# Patient Record
Sex: FEMALE | Race: WHITE | NOT HISPANIC OR LATINO | URBAN - NONMETROPOLITAN AREA
[De-identification: names, ages, dates, MRNs, and addresses within clinical notes are randomized per-mention and may not be internally consistent; named-entity substitution may affect disease eponyms.]

---

## 2023-03-22 DIAGNOSIS — I10 BENIGN ESSENTIAL HTN: Primary | ICD-10-CM

## 2023-03-22 RX ORDER — METOPROLOL TARTRATE 50 MG/1
50 TABLET ORAL 2 TIMES DAILY
Qty: 60 TABLET | Refills: 2 | Status: SHIPPED | OUTPATIENT
Start: 2023-03-22 | End: 2023-06-19 | Stop reason: SDUPTHER

## 2023-03-22 RX ORDER — METOPROLOL TARTRATE 50 MG/1
1 TABLET ORAL 2 TIMES DAILY
COMMUNITY
Start: 2022-11-18 | End: 2023-03-22 | Stop reason: SDUPTHER

## 2023-06-19 DIAGNOSIS — I10 BENIGN ESSENTIAL HTN: ICD-10-CM

## 2023-06-20 RX ORDER — METOPROLOL TARTRATE 50 MG/1
50 TABLET ORAL 2 TIMES DAILY
Qty: 60 TABLET | Refills: 0 | Status: SHIPPED | OUTPATIENT
Start: 2023-06-20 | End: 2023-07-15 | Stop reason: SDUPTHER

## 2023-07-15 DIAGNOSIS — I10 BENIGN ESSENTIAL HTN: ICD-10-CM

## 2023-07-17 RX ORDER — METOPROLOL TARTRATE 50 MG/1
50 TABLET ORAL 2 TIMES DAILY
Qty: 60 TABLET | Refills: 0 | Status: SHIPPED | OUTPATIENT
Start: 2023-07-17 | End: 2023-08-12 | Stop reason: SDUPTHER

## 2023-08-12 DIAGNOSIS — I10 BENIGN ESSENTIAL HTN: ICD-10-CM

## 2023-08-12 RX ORDER — METOPROLOL TARTRATE 50 MG/1
50 TABLET ORAL 2 TIMES DAILY
Qty: 30 TABLET | Refills: 0 | Status: SHIPPED | OUTPATIENT
Start: 2023-08-12 | End: 2023-09-05 | Stop reason: SDUPTHER

## 2023-09-05 DIAGNOSIS — I10 BENIGN ESSENTIAL HTN: ICD-10-CM

## 2023-09-06 RX ORDER — METOPROLOL TARTRATE 50 MG/1
50 TABLET ORAL 2 TIMES DAILY
Qty: 30 TABLET | Refills: 0 | Status: SHIPPED | OUTPATIENT
Start: 2023-09-06 | End: 2023-09-11 | Stop reason: SDUPTHER

## 2023-09-11 DIAGNOSIS — I10 BENIGN ESSENTIAL HTN: ICD-10-CM

## 2023-09-12 RX ORDER — METOPROLOL TARTRATE 50 MG/1
50 TABLET ORAL 2 TIMES DAILY
Qty: 90 TABLET | Refills: 3 | Status: SHIPPED | OUTPATIENT
Start: 2023-09-12 | End: 2023-12-27 | Stop reason: SINTOL

## 2023-10-02 ENCOUNTER — OFFICE VISIT (OUTPATIENT)
Dept: PRIMARY CARE | Facility: CLINIC | Age: 78
End: 2023-10-02
Payer: COMMERCIAL

## 2023-10-02 VITALS
BODY MASS INDEX: 25.46 KG/M2 | DIASTOLIC BLOOD PRESSURE: 58 MMHG | WEIGHT: 153 LBS | SYSTOLIC BLOOD PRESSURE: 126 MMHG | HEART RATE: 60 BPM | OXYGEN SATURATION: 99 %

## 2023-10-02 DIAGNOSIS — I10 BENIGN ESSENTIAL HTN: Primary | ICD-10-CM

## 2023-10-02 PROBLEM — I89.0 LYMPHEDEMA OF BOTH LOWER EXTREMITIES: Status: ACTIVE | Noted: 2023-10-02

## 2023-10-02 PROBLEM — R29.898 OTHER SYMPTOMS AND SIGNS INVOLVING THE MUSCULOSKELETAL SYSTEM: Status: ACTIVE | Noted: 2023-10-02

## 2023-10-02 PROBLEM — I87.2 VENOUS INSUFFICIENCY: Status: ACTIVE | Noted: 2023-10-02

## 2023-10-02 PROBLEM — J30.9 ALLERGIC RHINITIS: Status: ACTIVE | Noted: 2023-10-02

## 2023-10-02 PROBLEM — I89.0 LYMPHEDEMA OF LEG: Status: ACTIVE | Noted: 2023-10-02

## 2023-10-02 LAB
ALBUMIN SERPL BCP-MCNC: 4 G/DL (ref 3.4–5)
ALP SERPL-CCNC: 72 U/L (ref 33–136)
ALT SERPL W P-5'-P-CCNC: 17 U/L (ref 7–45)
ANION GAP SERPL CALC-SCNC: 16 MMOL/L (ref 10–20)
AST SERPL W P-5'-P-CCNC: 20 U/L (ref 9–39)
BASOPHILS # BLD AUTO: 0.04 X10*3/UL (ref 0–0.1)
BASOPHILS NFR BLD AUTO: 0.7 %
BILIRUB SERPL-MCNC: 0.3 MG/DL (ref 0–1.2)
BUN SERPL-MCNC: 16 MG/DL (ref 6–23)
CALCIUM SERPL-MCNC: 8.6 MG/DL (ref 8.6–10.3)
CHLORIDE SERPL-SCNC: 104 MMOL/L (ref 98–107)
CO2 SERPL-SCNC: 26 MMOL/L (ref 21–32)
CREAT SERPL-MCNC: 0.84 MG/DL (ref 0.5–1.05)
EOSINOPHIL # BLD AUTO: 0.29 X10*3/UL (ref 0–0.4)
EOSINOPHIL NFR BLD AUTO: 4.9 %
ERYTHROCYTE [DISTWIDTH] IN BLOOD BY AUTOMATED COUNT: 13.7 % (ref 11.5–14.5)
GFR SERPL CREATININE-BSD FRML MDRD: 73 ML/MIN/1.73M*2
GLUCOSE SERPL-MCNC: 98 MG/DL (ref 74–99)
HCT VFR BLD AUTO: 36.9 % (ref 36–46)
HGB BLD-MCNC: 11.6 G/DL (ref 12–16)
IMM GRANULOCYTES # BLD AUTO: 0.01 X10*3/UL (ref 0–0.5)
IMM GRANULOCYTES NFR BLD AUTO: 0.2 % (ref 0–0.9)
LYMPHOCYTES # BLD AUTO: 1.82 X10*3/UL (ref 0.8–3)
LYMPHOCYTES NFR BLD AUTO: 30.7 %
MCH RBC QN AUTO: 30.6 PG (ref 26–34)
MCHC RBC AUTO-ENTMCNC: 31.4 G/DL (ref 32–36)
MCV RBC AUTO: 97 FL (ref 80–100)
MONOCYTES # BLD AUTO: 0.58 X10*3/UL (ref 0.05–0.8)
MONOCYTES NFR BLD AUTO: 9.8 %
NEUTROPHILS # BLD AUTO: 3.19 X10*3/UL (ref 1.6–5.5)
NEUTROPHILS NFR BLD AUTO: 53.7 %
NRBC BLD-RTO: 0 /100 WBCS (ref 0–0)
PLATELET # BLD AUTO: 211 X10*3/UL (ref 150–450)
PMV BLD AUTO: 11.8 FL (ref 7.5–11.5)
POTASSIUM SERPL-SCNC: 4.1 MMOL/L (ref 3.5–5.3)
PROT SERPL-MCNC: 6.4 G/DL (ref 6.4–8.2)
RBC # BLD AUTO: 3.79 X10*6/UL (ref 4–5.2)
SODIUM SERPL-SCNC: 142 MMOL/L (ref 136–145)
TSH SERPL-ACNC: 3.36 MIU/L (ref 0.44–3.98)
WBC # BLD AUTO: 5.9 X10*3/UL (ref 4.4–11.3)

## 2023-10-02 PROCEDURE — 99213 OFFICE O/P EST LOW 20 MIN: CPT | Performed by: FAMILY MEDICINE

## 2023-10-02 PROCEDURE — 36415 COLL VENOUS BLD VENIPUNCTURE: CPT

## 2023-10-02 PROCEDURE — 3078F DIAST BP <80 MM HG: CPT | Performed by: FAMILY MEDICINE

## 2023-10-02 PROCEDURE — 1036F TOBACCO NON-USER: CPT | Performed by: FAMILY MEDICINE

## 2023-10-02 PROCEDURE — 1160F RVW MEDS BY RX/DR IN RCRD: CPT | Performed by: FAMILY MEDICINE

## 2023-10-02 PROCEDURE — 1159F MED LIST DOCD IN RCRD: CPT | Performed by: FAMILY MEDICINE

## 2023-10-02 PROCEDURE — 3074F SYST BP LT 130 MM HG: CPT | Performed by: FAMILY MEDICINE

## 2023-10-02 RX ORDER — SPIRONOLACTONE 25 MG/1
25 TABLET ORAL DAILY
COMMUNITY
Start: 2023-07-13 | End: 2023-10-02 | Stop reason: SDUPTHER

## 2023-10-02 RX ORDER — SPIRONOLACTONE 25 MG/1
25 TABLET ORAL DAILY
Qty: 90 TABLET | Refills: 3 | Status: SHIPPED | OUTPATIENT
Start: 2023-10-02

## 2023-10-02 RX ORDER — ASPIRIN 325 MG
325 TABLET ORAL DAILY
COMMUNITY
End: 2023-12-27 | Stop reason: SINTOL

## 2023-10-02 NOTE — PATIENT INSTRUCTIONS
I will have your test results on your secureach card within a week -   If not -  please call me     If you decide you would like to get an echo on your heart (ultrasound)    - let me know - I will place an order       Hypertension Reminders:    IF YOU ARE A PERSON WHOSE BLOOD PRESSURE RUNS HIGH IN THE DOCTOR'S OFFICE,  THEN WE NEED TO VERIFY YOUR CUFF AT LEAST  ONCE YEARLY.   ALWAYS BRING CUFF WITH YOU TO ANY HYPERTENSION CHECK UP APPOINTMENT.  WE CAN RECORD YOUR BP  FROM HOME THE DAY OF THE APPOINTMENT - BUT WE HAVE TO SEE IT ON YOUR MACHINE.      To accurately check your blood pressure -  be sure to sit and relax for 5 minutes, you need your back supported, feet flat on the ground, arm heart level and relaxed.    Generally speaking, well controlled hypertension is below 130/80   for  most people  and if you are over 75, below 140/90  is acceptable.    Please take your medication as directed, and if you forget a dose  DO NOT DOUBLE THE DOSE THE NEXT DAY, just take is as you normally would.     It is important to stay on a low sodium diet :  1500 - 2000 mg of sodium a day  -  it is important to read labels.    Regular Exercise is very important as well.  Always gradually increase your exercise regimen.  Your goal is 30 minutes of a good cardiovascular exercise at least 5 days a week.    IF YOUR BMI (BODY MASS INDEX) IS OVER 25, LOSING WEIGHT WILL HELP CONTROL YOUR BLOOD PRESSURE.   Talk to me further if you need help doing this.        It is very important NOT to smoke  or use any tobacco products.  Talk to me about options if you want help quitting.    It is very important to keep your alcohol in take low.   Generally speaking, adult men should not drink more than 2 regular size beers a day, or no more than 2 ounces of liquor, or no more than 12 ounces of wine.  For adult women - the recommendations are half that.    BUT , THIS IS NOT UNIVERSAL   - be sure to ask me if alcohol is safe for you to drink, and if so,  the acceptable amount.      Follow up in a year is fine if every thing is good

## 2023-10-02 NOTE — PROGRESS NOTES
Subjective   Patient ID: Mita Snyder is a 77 y.o. female who presents for Hypertension (Yearly check up for refills.).    HPI      LOV 11/2022       Updates and Concerns:     BP was still fluctuating -   Had the [facility] out -   They started the spironolactone about 6 mos ago - takes daily -   No blood work has been checked -    Pt is taking an over the counter potassium pill          HTN -   Taking Metoprolol  50 mg BID  -     And spironolactone 25 mg a day     (New pt to me 2021   Changed her from ACE due to how she was feeling to Losartan/HCTZ   -   Was having a lot of rashes -   Eventually changed to Metoprolol )         Chronic Edema  and Varicose veins -        Taking natural things for veins          And  mg a day     No longer wearing support stockings         She was having a lot of fatigue -     That's when she started the potassium   Willing to check it today          WAC -   Postmenopausal   No vaccines - except polio as a child  No mammograms  No scopes     Sister is Temi Snyder       - Tony Snyder - Sees DR Robert   She is Tony's second wife - she has 15 step kids        Review of Systems    Objective   /58 (BP Location: Right arm, Patient Position: Sitting, BP Cuff Size: Adult)   Pulse 60   Wt 69.4 kg (153 lb)   SpO2 99%   BMI 25.46 kg/m²     Physical Exam  Vitals reviewed.   Constitutional:       General: She is not in acute distress.     Appearance: Normal appearance.   HENT:      Head: Normocephalic and atraumatic.      Nose: Nose normal.      Mouth/Throat:      Mouth: Mucous membranes are moist.      Pharynx: No posterior oropharyngeal erythema.   Eyes:      Extraocular Movements: Extraocular movements intact.      Conjunctiva/sclera: Conjunctivae normal.      Pupils: Pupils are equal, round, and reactive to light.   Cardiovascular:      Rate and Rhythm: Normal rate and regular rhythm.      Heart sounds: Normal heart sounds. No murmur heard.  Pulmonary:       Effort: Pulmonary effort is normal. No respiratory distress.      Breath sounds: Normal breath sounds. No wheezing.   Musculoskeletal:      Cervical back: No rigidity.   Lymphadenopathy:      Cervical: No cervical adenopathy.   Skin:     General: Skin is warm and dry.      Findings: No rash.   Neurological:      General: No focal deficit present.      Mental Status: She is alert. Mental status is at baseline.   Psychiatric:         Mood and Affect: Mood normal.         Thought Content: Thought content normal.         Assessment/Plan   Problem List Items Addressed This Visit             ICD-10-CM       Medium    Benign essential HTN - Primary I10    Relevant Medications    spironolactone (Aldactone) 25 mg tablet    Other Relevant Orders    Comprehensive Metabolic Panel    CBC and Auto Differential    TSH with reflex to Free T4 if abnormal     Discussed checking an echo -   She does not want to at this time     We discussed at visit any disease processes that were of concern as well as the risks, benefits and instructions of any new medication provided.    See orders and discussion section for information handed to patient on their Clinical Summary.   Patient (and/or caretaker of patient if present)  stated all questions were answered, and they voiced understanding of instructions.

## 2023-10-04 DIAGNOSIS — D64.9 ANEMIA, UNSPECIFIED TYPE: Primary | ICD-10-CM

## 2023-12-27 ENCOUNTER — OFFICE VISIT (OUTPATIENT)
Dept: PRIMARY CARE | Facility: CLINIC | Age: 78
End: 2023-12-27
Payer: COMMERCIAL

## 2023-12-27 ENCOUNTER — TELEPHONE (OUTPATIENT)
Dept: PRIMARY CARE | Facility: CLINIC | Age: 78
End: 2023-12-27

## 2023-12-27 VITALS
BODY MASS INDEX: 25.96 KG/M2 | SYSTOLIC BLOOD PRESSURE: 142 MMHG | OXYGEN SATURATION: 95 % | DIASTOLIC BLOOD PRESSURE: 72 MMHG | HEART RATE: 72 BPM | WEIGHT: 156 LBS

## 2023-12-27 DIAGNOSIS — I10 BENIGN ESSENTIAL HTN: ICD-10-CM

## 2023-12-27 DIAGNOSIS — T78.40XA ALLERGIC REACTION, INITIAL ENCOUNTER: ICD-10-CM

## 2023-12-27 DIAGNOSIS — D64.9 ANEMIA, UNSPECIFIED TYPE: Primary | ICD-10-CM

## 2023-12-27 PROCEDURE — 3078F DIAST BP <80 MM HG: CPT | Performed by: FAMILY MEDICINE

## 2023-12-27 PROCEDURE — 1160F RVW MEDS BY RX/DR IN RCRD: CPT | Performed by: FAMILY MEDICINE

## 2023-12-27 PROCEDURE — 1036F TOBACCO NON-USER: CPT | Performed by: FAMILY MEDICINE

## 2023-12-27 PROCEDURE — 1159F MED LIST DOCD IN RCRD: CPT | Performed by: FAMILY MEDICINE

## 2023-12-27 PROCEDURE — 3077F SYST BP >= 140 MM HG: CPT | Performed by: FAMILY MEDICINE

## 2023-12-27 PROCEDURE — 99213 OFFICE O/P EST LOW 20 MIN: CPT | Performed by: FAMILY MEDICINE

## 2023-12-27 RX ORDER — ASPIRIN 81 MG/1
81 TABLET ORAL DAILY
COMMUNITY

## 2023-12-27 RX ORDER — METHYLPREDNISOLONE 4 MG/1
TABLET ORAL
Qty: 21 TABLET | Refills: 0 | Status: SHIPPED | OUTPATIENT
Start: 2023-12-27 | End: 2024-01-03

## 2023-12-27 RX ORDER — AMLODIPINE BESYLATE 5 MG/1
5 TABLET ORAL DAILY
Qty: 30 TABLET | Refills: 0 | Status: SHIPPED | OUTPATIENT
Start: 2023-12-27 | End: 2024-01-18 | Stop reason: SDUPTHER

## 2023-12-27 NOTE — PATIENT INSTRUCTIONS
Stop the metoprolol  - start amlodipine 5 mg a day   Stay on Spironolactone for now     Take a daily long acting allergy med - like Claritin or allegra or zyrtec     I am sending a steroid pack to to help with the reaction       Wear the support stocking now to help prevent swelling -       Let me know how this is after off the steroids

## 2023-12-27 NOTE — PROGRESS NOTES
Subjective   Patient ID: Mita Snyder is a 78 y.o. female who presents for Allergic Reaction (Inflammation around neck, rash on neck and face, face is selling and she is itching.).    HPI     Sunday - itch around neck started - a burning itch   Neck, face, arms     Allergy med did nothing to help     Does not use creams or lotions -   Only uses DOVE soaps   Uses Purex       Hx of allergic reactions to BP meds - THEY DID THE SAME RASH IN THE PAST   11/2022 - change from Nebivolol to Metprolol for the same thing   (Hx of ACE cough and felt poorly on Losartan/hydrochlorothiazide)       Can tell that shortly after taking metprolol rash worsens     Night sweats since mid-summer       In OCT -  HBG low -   Started to take her aspirin with food - went to 81 mg   (On this for her veins - no hx of DVT)     Did not start the MVI with FE      (Pt left  - we forgot to get CBC)     Review of Systems    Objective   /72 (BP Location: Left arm, Patient Position: Sitting, BP Cuff Size: Large adult)   Pulse 72   Wt 70.8 kg (156 lb)   SpO2 95%   BMI 25.96 kg/m²     Physical Exam  Vitals reviewed.   Constitutional:       Appearance: She is not ill-appearing.   Cardiovascular:      Rate and Rhythm: Normal rate.   Pulmonary:      Effort: Pulmonary effort is normal.   Skin:     Comments: PINK FACE , NECK AND CHEST -   SMALL PAPULES DIFFUSELY    Neurological:      Mental Status: She is alert.         Assessment/Plan   Problem List Items Addressed This Visit             ICD-10-CM       Medium    Benign essential HTN I10    Relevant Medications    amLODIPine (Norvasc) 5 mg tablet     Other Visit Diagnoses         Codes    Anemia, unspecified type    -  Primary D64.9    Relevant Orders    CBC and Auto Differential    Allergic reaction, initial encounter     T78.40XA    Relevant Medications    methylPREDNISolone (Medrol Dospak) 4 mg tablets             Most likely allergic med reaction to Metoprolol -   Will try steroids to calm it  down  -     And change her from Metoprolol to amlodipine -   But she does get edema  - warned her about that  -   She does have stockings to wear and agreed to do that to help     Also - we were going to recheck CBC -   But she left before we did  -     Will need to make lab appt

## 2023-12-27 NOTE — TELEPHONE ENCOUNTER
I am reacting to something and I think it may be my blood pressure pills because it is just like before with the inflammation and rash around my neck and itchiness.  Please give me a call.

## 2024-01-08 ENCOUNTER — TELEPHONE (OUTPATIENT)
Dept: PRIMARY CARE | Facility: CLINIC | Age: 79
End: 2024-01-08
Payer: COMMERCIAL

## 2024-01-18 DIAGNOSIS — I10 BENIGN ESSENTIAL HTN: ICD-10-CM

## 2024-01-18 RX ORDER — AMLODIPINE BESYLATE 5 MG/1
5 TABLET ORAL DAILY
Qty: 90 TABLET | Refills: 3 | Status: SHIPPED | OUTPATIENT
Start: 2024-01-18 | End: 2025-01-12

## 2024-04-15 ENCOUNTER — TELEPHONE (OUTPATIENT)
Dept: PRIMARY CARE | Facility: CLINIC | Age: 79
End: 2024-04-15
Payer: COMMERCIAL

## 2024-04-15 NOTE — TELEPHONE ENCOUNTER
On Friday I started with having floaters in my right eye. Could the blood pressure medication be causing that?  Please call.

## 2024-04-18 ENCOUNTER — TELEPHONE (OUTPATIENT)
Dept: PRIMARY CARE | Facility: CLINIC | Age: 79
End: 2024-04-18
Payer: COMMERCIAL

## 2024-04-18 NOTE — TELEPHONE ENCOUNTER
The floaters are much better, I seldom see any anymore.  I am going to go ahead and refill my blood pressure medication.  My blood pressure has been stable on it.

## 2025-01-12 DIAGNOSIS — I10 BENIGN ESSENTIAL HTN: ICD-10-CM

## 2025-01-12 RX ORDER — AMLODIPINE BESYLATE 5 MG/1
5 TABLET ORAL DAILY
Qty: 90 TABLET | Refills: 3 | Status: SHIPPED | OUTPATIENT
Start: 2025-01-12

## 2025-01-30 ENCOUNTER — APPOINTMENT (OUTPATIENT)
Dept: PRIMARY CARE | Facility: CLINIC | Age: 80
End: 2025-01-30
Payer: COMMERCIAL

## 2025-01-30 VITALS
BODY MASS INDEX: 25.63 KG/M2 | SYSTOLIC BLOOD PRESSURE: 112 MMHG | WEIGHT: 154 LBS | OXYGEN SATURATION: 97 % | HEART RATE: 81 BPM | DIASTOLIC BLOOD PRESSURE: 62 MMHG

## 2025-01-30 DIAGNOSIS — I10 BENIGN ESSENTIAL HTN: Primary | ICD-10-CM

## 2025-01-30 PROCEDURE — 99213 OFFICE O/P EST LOW 20 MIN: CPT | Performed by: FAMILY MEDICINE

## 2025-01-30 PROCEDURE — 1036F TOBACCO NON-USER: CPT | Performed by: FAMILY MEDICINE

## 2025-01-30 PROCEDURE — 3074F SYST BP LT 130 MM HG: CPT | Performed by: FAMILY MEDICINE

## 2025-01-30 PROCEDURE — 1160F RVW MEDS BY RX/DR IN RCRD: CPT | Performed by: FAMILY MEDICINE

## 2025-01-30 PROCEDURE — 1159F MED LIST DOCD IN RCRD: CPT | Performed by: FAMILY MEDICINE

## 2025-01-30 PROCEDURE — 3078F DIAST BP <80 MM HG: CPT | Performed by: FAMILY MEDICINE

## 2025-01-30 NOTE — PROGRESS NOTES
Subjective   Patient ID: Mita Snyder is a 79 y.o. female who presents for Hypertension (She also has an issue where her upper lip kind of swells a little, blisters up and the skin peels off.  It is feeling a little swollen today and that is why she is mentioning it.).    [facility] 2023       Updates and Concerns:     Notices her upper lip swells and gets a blister -   Comes and goes  - crusts over        passed away in            Chronic issues reviewed today:      HTN -   Amlodipine 5 mg daily       Does gets swelling of her legs by the end of the day     Does like salt       (New pt to me    Changed her from ACE due to how she was feeling to Losartan/HCTZ   -   Was having a lot of rashes -   Eventually changed to Metoprolol  -   Did not tolerate that)        Chronic Edema  and Varicose veins -        Taking natural things for veins          And ASA 81 mg a day     No longer wearing support stockings        Feels better with OTC potassium        WAC -   Postmenopausal   No vaccines - except polio as a child  No mammograms  No scopes     Sister is Temi Snyder       - Tony Snyder -  2024  She was Tony's second wife - she has 15 step kids        Review of Systems    Objective   /62 (BP Location: Right arm, Patient Position: Sitting, BP Cuff Size: Large adult)   Pulse 81   Wt 69.9 kg (154 lb)   SpO2 97%   BMI 25.63 kg/m²     Physical Exam  Vitals reviewed.   Constitutional:       General: She is not in acute distress.     Appearance: Normal appearance.   HENT:      Head: Normocephalic and atraumatic.      Nose: Nose normal.      Mouth/Throat:      Mouth: Mucous membranes are moist.      Pharynx: No posterior oropharyngeal erythema.   Eyes:      Extraocular Movements: Extraocular movements intact.      Conjunctiva/sclera: Conjunctivae normal.      Pupils: Pupils are equal, round, and reactive to light.   Cardiovascular:      Rate and Rhythm: Normal rate and regular  rhythm.      Heart sounds: Normal heart sounds. No murmur heard.  Pulmonary:      Effort: Pulmonary effort is normal. No respiratory distress.      Breath sounds: Normal breath sounds. No wheezing.   Musculoskeletal:      Cervical back: No rigidity.   Lymphadenopathy:      Cervical: No cervical adenopathy.   Skin:     General: Skin is warm and dry.      Findings: No rash.   Neurological:      General: No focal deficit present.      Mental Status: She is alert. Mental status is at baseline.   Psychiatric:         Mood and Affect: Mood normal.         Thought Content: Thought content normal.         Assessment/Plan   Problem List Items Addressed This Visit             ICD-10-CM       Medium    Benign essential HTN - Primary I10    Relevant Orders    CBC and Auto Differential    Basic Metabolic Panel     Doing well  -     Sounds like she periodically gets herpes labialis   Did not want rx today       We discussed at visit any disease processes that were of concern as well as the risks, benefits and instructions of any new medication provided.    See orders and discussion section for information handed to patient on their Clinical Summary.   Patient (and/or caretaker of patient if present)  stated all questions were answered, and they voiced understanding of instructions.

## 2025-01-30 NOTE — PATIENT INSTRUCTIONS
The swelling is likely due to the amlodipine -   Keep your sodium intake low -  1500 mg a day   Plenty of water   Can wear support stockings   Keep legs up when sitting         If you decide you want the RX for the lip  - let me know  Otherwise just keep vaseline on it when it breaks out.         Hypertension Reminders:    IF YOU ARE A PERSON WHOSE BLOOD PRESSURE RUNS HIGH IN THE DOCTOR'S OFFICE,  THEN WE NEED TO VERIFY YOUR CUFF AT LEAST  ONCE YEARLY.   ALWAYS BRING CUFF WITH YOU TO ANY HYPERTENSION CHECK UP APPOINTMENT.  WE CAN RECORD YOUR BP  FROM HOME THE DAY OF THE APPOINTMENT - BUT WE HAVE TO SEE IT ON YOUR MACHINE.      To accurately check your blood pressure -  be sure to sit and relax for 5 minutes, you need your back supported, feet flat on the ground, arm heart level and relaxed.    Generally speaking, well controlled hypertension is below 130/80   for  most people  and if you are over 75, below 140/90  is acceptable.    Please take your medication as directed, and if you forget a dose  DO NOT DOUBLE THE DOSE THE NEXT DAY, just take is as you normally would.     It is important to stay on a low sodium diet :  1500 - 2000 mg of sodium a day  -  it is important to read labels.    Regular Exercise is very important as well.  Always gradually increase your exercise regimen.  Your goal is 30 minutes of a good cardiovascular exercise at least 5 days a week.    IF YOUR BMI (BODY MASS INDEX) IS OVER 25, LOSING WEIGHT WILL HELP CONTROL YOUR BLOOD PRESSURE.   Talk to me further if you need help doing this.        It is very important NOT to smoke  or use any tobacco products.  Talk to me about options if you want help quitting.    It is very important to keep your alcohol in take low.   Generally speaking, adult men should not drink more than 2 regular size beers a day, or no more than 2 ounces of liquor, or no more than 12 ounces of wine.  For adult women - the recommendations are half that.    BUT , THIS IS NOT  UNIVERSAL   - be sure to ask me if alcohol is safe for you to drink, and if so, the acceptable amount.        Appt can be in a year if all is fine

## 2025-01-31 LAB
ANION GAP SERPL CALCULATED.4IONS-SCNC: 8 MMOL/L (CALC) (ref 7–17)
BASOPHILS # BLD AUTO: 58 CELLS/UL (ref 0–200)
BASOPHILS NFR BLD AUTO: 1 %
BUN SERPL-MCNC: 13 MG/DL (ref 7–25)
BUN/CREAT SERPL: NORMAL (CALC) (ref 6–22)
CALCIUM SERPL-MCNC: 9.1 MG/DL (ref 8.6–10.4)
CHLORIDE SERPL-SCNC: 103 MMOL/L (ref 98–110)
CO2 SERPL-SCNC: 29 MMOL/L (ref 20–32)
CREAT SERPL-MCNC: 0.72 MG/DL (ref 0.6–1)
EGFRCR SERPLBLD CKD-EPI 2021: 86 ML/MIN/1.73M2
EOSINOPHIL # BLD AUTO: 284 CELLS/UL (ref 15–500)
EOSINOPHIL NFR BLD AUTO: 4.9 %
ERYTHROCYTE [DISTWIDTH] IN BLOOD BY AUTOMATED COUNT: 12.9 % (ref 11–15)
GLUCOSE SERPL-MCNC: 90 MG/DL (ref 65–99)
HCT VFR BLD AUTO: 36.6 % (ref 35–45)
HGB BLD-MCNC: 11.7 G/DL (ref 11.7–15.5)
LYMPHOCYTES # BLD AUTO: 1595 CELLS/UL (ref 850–3900)
LYMPHOCYTES NFR BLD AUTO: 27.5 %
MCH RBC QN AUTO: 29.4 PG (ref 27–33)
MCHC RBC AUTO-ENTMCNC: 32 G/DL (ref 32–36)
MCV RBC AUTO: 92 FL (ref 80–100)
MONOCYTES # BLD AUTO: 679 CELLS/UL (ref 200–950)
MONOCYTES NFR BLD AUTO: 11.7 %
NEUTROPHILS # BLD AUTO: 3184 CELLS/UL (ref 1500–7800)
NEUTROPHILS NFR BLD AUTO: 54.9 %
PLATELET # BLD AUTO: 256 THOUSAND/UL (ref 140–400)
PMV BLD REES-ECKER: 11.1 FL (ref 7.5–12.5)
POTASSIUM SERPL-SCNC: 3.9 MMOL/L (ref 3.5–5.3)
RBC # BLD AUTO: 3.98 MILLION/UL (ref 3.8–5.1)
SODIUM SERPL-SCNC: 140 MMOL/L (ref 135–146)
WBC # BLD AUTO: 5.8 THOUSAND/UL (ref 3.8–10.8)